# Patient Record
Sex: FEMALE | Race: OTHER | Employment: STUDENT | URBAN - METROPOLITAN AREA
[De-identification: names, ages, dates, MRNs, and addresses within clinical notes are randomized per-mention and may not be internally consistent; named-entity substitution may affect disease eponyms.]

---
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
## 2019-02-18 ENCOUNTER — TELEPHONE (OUTPATIENT)
Dept: GASTROENTEROLOGY | Facility: CLINIC | Age: 19
End: 2019-02-18

## 2019-02-18 NOTE — TELEPHONE ENCOUNTER
Marifer Christianson requesting RN to mail U/S abdomen complete to    [address] [place], [address] [facility]    Also requesting orders to be faxed to [phone] attn: Roel Brito. For add'l questions pls call. Thank you.

## 2019-03-11 ENCOUNTER — TELEPHONE (OUTPATIENT)
Dept: GASTROENTEROLOGY | Facility: CLINIC | Age: 19
End: 2019-03-11

## 2019-03-11 NOTE — TELEPHONE ENCOUNTER
Received results of abdominal US from [facility] and placed on CAB's desk for review with a scanning sheet

## 2019-03-19 ENCOUNTER — TELEPHONE (OUTPATIENT)
Dept: GASTROENTEROLOGY | Facility: CLINIC | Age: 19
End: 2019-03-19

## 2019-03-19 NOTE — TELEPHONE ENCOUNTER
Faxed report received from [facility]. Ultrasound abdomen complete  3/5/2019    \"Indication: Periumbilical mass, abdominal pain    Findings: The liver is homogenous in echotexture and normal in size.   The gallbladder is moderately di

## 2019-03-19 NOTE — TELEPHONE ENCOUNTER
GI RNs–  Please call Ms. Leo Dillon or her mother to advise that the abdominal ultrasound exam of 3/5/2019 looked good. There was nothing wrong on that US exam to explain her pain and vomiting. Most importantly, normal gallbladder, no gallstones.     Normal

## 2019-03-20 NOTE — TELEPHONE ENCOUNTER
Mother calling for results from [address] [facility] [address].  Please call [phone]

## 2019-03-20 NOTE — TELEPHONE ENCOUNTER
Pt notified of results and verbalizes understanding. Mother is still perplexed    I asked the mother if her daughter stopped the marijuana for 2 months to see if her symptoms improved.  She will ask her daughter    If no improvement of symptoms, they will c

## 2019-03-27 NOTE — TELEPHONE ENCOUNTER
I spoke to the pt's mother. She states her daughter works late at night and comes home and eats. Her daughter says she thinks this is why food is coming up on her    She is going to give it some time and change her eating habits.     If symptoms do not

## 2022-08-23 ENCOUNTER — TELEPHONE (OUTPATIENT)
Dept: OBGYN CLINIC | Facility: CLINIC | Age: 22
End: 2022-08-23

## 2022-08-23 ENCOUNTER — OFFICE VISIT (OUTPATIENT)
Dept: OBGYN CLINIC | Facility: CLINIC | Age: 22
End: 2022-08-23
Payer: COMMERCIAL

## 2022-08-23 VITALS
SYSTOLIC BLOOD PRESSURE: 100 MMHG | HEIGHT: 60 IN | DIASTOLIC BLOOD PRESSURE: 62 MMHG | WEIGHT: 141 LBS | BODY MASS INDEX: 27.68 KG/M2

## 2022-08-23 DIAGNOSIS — N93.9 ABNORMAL UTERINE BLEEDING: Primary | ICD-10-CM

## 2022-08-23 PROBLEM — Z30.09 ENCOUNTER FOR EDUCATION ABOUT CONTRACEPTIVE USE: Status: RESOLVED | Noted: 2017-06-26 | Resolved: 2022-08-23

## 2022-08-23 LAB
CONTROL LINE PRESENT WITH A CLEAR BACKGROUND (YES/NO): YES YES/NO
KIT EXPIRATION DATE: NORMAL DATE
PREGNANCY TEST, URINE: NEGATIVE

## 2022-08-23 PROCEDURE — 3008F BODY MASS INDEX DOCD: CPT | Performed by: OBSTETRICS & GYNECOLOGY

## 2022-08-23 PROCEDURE — 3074F SYST BP LT 130 MM HG: CPT | Performed by: OBSTETRICS & GYNECOLOGY

## 2022-08-23 PROCEDURE — 99214 OFFICE O/P EST MOD 30 MIN: CPT | Performed by: OBSTETRICS & GYNECOLOGY

## 2022-08-23 PROCEDURE — 87591 N.GONORRHOEAE DNA AMP PROB: CPT | Performed by: OBSTETRICS & GYNECOLOGY

## 2022-08-23 PROCEDURE — 87491 CHLMYD TRACH DNA AMP PROBE: CPT | Performed by: OBSTETRICS & GYNECOLOGY

## 2022-08-23 PROCEDURE — 81025 URINE PREGNANCY TEST: CPT | Performed by: OBSTETRICS & GYNECOLOGY

## 2022-08-23 PROCEDURE — 3078F DIAST BP <80 MM HG: CPT | Performed by: OBSTETRICS & GYNECOLOGY

## 2022-08-23 NOTE — TELEPHONE ENCOUNTER
Pt is calling and stated she has a place where she want to do her US.  [address]  the phone number is [phone]

## 2022-08-23 NOTE — TELEPHONE ENCOUNTER
LMTCB    Faxed order to [facility]. Called pt, LVM order faxed, call [facility] to schedule. Pt called back and informed. Pt agrees.

## 2022-08-25 ENCOUNTER — TELEPHONE (OUTPATIENT)
Dept: OBGYN CLINIC | Facility: CLINIC | Age: 22
End: 2022-08-25

## 2022-08-25 ENCOUNTER — PATIENT MESSAGE (OUTPATIENT)
Dept: OBGYN CLINIC | Facility: CLINIC | Age: 22
End: 2022-08-25

## 2022-08-25 LAB
C TRACH DNA SPEC QL NAA+PROBE: POSITIVE
N GONORRHOEA DNA SPEC QL NAA+PROBE: NEGATIVE

## 2022-08-25 RX ORDER — DOXYCYCLINE HYCLATE 100 MG
100 TABLET ORAL 2 TIMES DAILY
Qty: 20 TABLET | Refills: 0 | Status: SHIPPED | OUTPATIENT
Start: 2022-08-25 | End: 2022-09-04

## 2022-08-25 NOTE — TELEPHONE ENCOUNTER
From: Hemanth Cantu  To: Yoseph Gordon MD  Sent: 8/25/2022 10:33 AM CDT  Subject: Radhahawa Charles- Pap Results, Blood Results, and Urine Results    Hello Dr,  I have placed the attachments below from my lab results from [address] [place] [address] from my latest tests.

## 2022-08-25 NOTE — PROGRESS NOTES
Called pt and provided LC's instructions. Pt would like to continue with ultrasound d/t having bleeding before Chlamydia. Per pt had urine Chlamydia test in June at [facility] which was neg. Reviewed care everywhere, did not see results therefore encouraged to send screen shot via my chart. Pt verbalized understanding.

## 2022-09-12 ENCOUNTER — TELEPHONE (OUTPATIENT)
Dept: OBGYN CLINIC | Facility: CLINIC | Age: 22
End: 2022-09-12

## 2022-09-12 NOTE — TELEPHONE ENCOUNTER
RN spoke with pt. Pt states that she had her US done earlier this month at an outside facility, and she would like to review her results with LC. RN told pt that this office has not seen the results. RN provided fax number and told pt to have results faxed to our office. Pt verbalized understanding and agreed with POC.

## 2022-09-12 NOTE — TELEPHONE ENCOUNTER
Pt called phone service stating she would like to [facility] regarding her ultrasound results. Please follow up.

## 2022-09-15 ENCOUNTER — TELEPHONE (OUTPATIENT)
Dept: OBGYN CLINIC | Facility: CLINIC | Age: 22
End: 2022-09-15

## 2022-09-15 NOTE — TELEPHONE ENCOUNTER
Called pt informed report on LC's desk and on Tuesday when she is in the office, she can provide results. Pt agrees.

## 2022-09-15 NOTE — TELEPHONE ENCOUNTER
Patient is calling in to see if we have received the US report from [facility] and would also like to know the results.

## 2022-09-20 ENCOUNTER — MED REC SCAN ONLY (OUTPATIENT)
Dept: OBGYN CLINIC | Facility: CLINIC | Age: 22
End: 2022-09-20

## 2022-12-16 ENCOUNTER — TELEPHONE (OUTPATIENT)
Dept: OBGYN CLINIC | Facility: CLINIC | Age: 22
End: 2022-12-16

## 2023-02-04 ENCOUNTER — PATIENT MESSAGE (OUTPATIENT)
Dept: OBGYN CLINIC | Facility: CLINIC | Age: 23
End: 2023-02-04

## 2023-02-06 NOTE — TELEPHONE ENCOUNTER
From: Irma Jeffries  To: Mirna Dakin, MD  Sent: 2/4/2023 10:44 AM CST  Subject: Hillery Cornea [facility],    I wanted to follow up with you about my unusual missed periods. After taking my antibiotics for chlamydia I noticed that I started bleeding but I thought it was part of the medication or just my period coming through. A month after I believe I did get my period because I had all the symptoms for it and it was light and lasted as long as a period would. That was back in September, since then I still have not gotten a period. I have also have not had any sexual interactions with anyone since taking the antibiotics. I am kindly reaching out to see what I should do or how can this be further evaluated If this is not normal. Hope to hear from you soon.  Thank you    - Alondra Bowman

## 2023-02-13 ENCOUNTER — PATIENT MESSAGE (OUTPATIENT)
Dept: OBGYN CLINIC | Facility: CLINIC | Age: 23
End: 2023-02-13

## 2023-02-14 ENCOUNTER — PATIENT MESSAGE (OUTPATIENT)
Dept: OBGYN CLINIC | Facility: CLINIC | Age: 23
End: 2023-02-14

## 2023-02-14 NOTE — TELEPHONE ENCOUNTER
From: Maximo Streeter  Sent: 2/13/2023 4:25 PM CST  To: [facility] Clinical Staff  Subject: Follow Up    Peggy Mulligan,  I have a scheduled appointment with my primary doctor tomorrow to get the labs completed. Can you send the order to her fax number ? [phone]. I have already let them know to send my results to Dr. Kyree Nicole. Is there a fax number I should provide my physician with in order to send the results?

## 2024-07-01 ENCOUNTER — TELEPHONE (OUTPATIENT)
Dept: OBGYN CLINIC | Facility: CLINIC | Age: 24
End: 2024-07-01

## 2024-07-01 NOTE — TELEPHONE ENCOUNTER
Received a call from patient seeking to be seen today .The patient believes she is experiencing a miscarriage due to cramping and bleeding that started this morning   Also there was some spotting on Friday but with no pain .    Please advice .

## 2024-07-01 NOTE — TELEPHONE ENCOUNTER
RN spoke with pt. Pt's LMP was 5/26 (5w1d). Pt was a few days late last week, she should took a test and had 2 +pregnancy tests. On Friday, she had some brown spotting, but it resolved. Yesterday, she had more brown spotting, but it resolved. Today she is having bright red bleeding and cramps. Pt almost filled a pad in 3.5 hours. 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 RN told pt to report to ER for further evaluation. Pt doesn't want to go to the ER and would like to know what to look out for. RN told pt that she should monitor her bleeding and that she may pass clots and tissue if she is indeed miscarrying. RN told pt that if she starts saturating a pad hourly or feels dizzy or lightheaded, she should go directly to the ER. RN told pt to check in later this afternoon to let RN know how she's doing. Pt verbalized understanding and agreed with plan of care.

## 2024-07-01 NOTE — TELEPHONE ENCOUNTER
RN spoke with pt. Pt is still bleeding and thinks that she is passing tissue and clots. Pt is still having cramping. RN told pt that this will likely continue for a little while. Pt is wondering about next steps. RN told pt that RN would reach out to Dr. Hammonds and would call her back. Pt verbalized understanding and agreed with plan of care.

## 2024-07-01 NOTE — TELEPHONE ENCOUNTER
RN spoke with pt and scheduled her for a follow-up appt with Dr. Pires (OK per RD). Pt verbalized understanding and agreed with plan of care.   Detail Level: Generalized Detail Level: Zone Detail Level: Simple

## 2024-07-08 ENCOUNTER — OFFICE VISIT (OUTPATIENT)
Dept: OBGYN CLINIC | Facility: CLINIC | Age: 24
End: 2024-07-08
Payer: COMMERCIAL

## 2024-07-08 VITALS
SYSTOLIC BLOOD PRESSURE: 102 MMHG | WEIGHT: 137 LBS | DIASTOLIC BLOOD PRESSURE: 68 MMHG | BODY MASS INDEX: 26.9 KG/M2 | HEIGHT: 60 IN

## 2024-07-08 DIAGNOSIS — N89.8 VAGINAL ODOR: ICD-10-CM

## 2024-07-08 DIAGNOSIS — O03.9 MISCARRIAGE (HCC): Primary | ICD-10-CM

## 2024-07-08 RX ORDER — CABERGOLINE 0.5 MG/1
TABLET ORAL
COMMUNITY
Start: 2023-05-08

## 2024-07-08 RX ORDER — FLUCONAZOLE 150 MG/1
150 TABLET ORAL WEEKLY
COMMUNITY
Start: 2024-06-06 | End: 2024-07-08 | Stop reason: ALTCHOICE

## 2024-07-08 RX ORDER — CEPHALEXIN 500 MG/1
CAPSULE ORAL
COMMUNITY
Start: 2024-05-07 | End: 2024-07-08 | Stop reason: ALTCHOICE

## 2024-07-08 NOTE — PROGRESS NOTES
RETURN GYN OFFICE VISIT  [facility]    CHIEF COMPLAINT:    Chief Complaint   Patient presents with    Miscarriage     Pt states no vaginal bleeding or cramping        HISTORY OF PRESENT ILLNESS:    REEMA is a 23 year old female  here for   Bleeding got heaiver than a period when she called last week,    Bleeding stopped 3 days ago, last  spotting was yesterday .    No cramping.    LMP 5/, typically normal periods.    Has noticed a different odor when peeing - UA was negative. Somtimes has some discomfort with sex. Wonders if there is an infection.    REVIEW OF SYSTEMS:   CONSTITUTIONAL:  negative for fevers, chills, sweats and fatigue  GASTROINTESTINAL:  negative for nausea, vomiting, blood in stool, constipation, diarrhea and abdominal pain  GENITOURINARY: negative for no dysuria, urgency or frequency; no urinary incontinence; no hematuria  SKIN:  negative for  rash, skin lesion and pruritus  ENDOCRINE:  negative for acne, fatigue, weight gain and weight loss  BEHAVIOR/PSYCH:  negative for depressed mood, anhedonia and anxiety    CURRENT MEDICATIONS:      Current Outpatient Medications:     cabergoline 0.5 MG Oral Tab, 1/2  Tablet 3x a week (Mon/Thursday/Sat), Disp: , Rfl:     PAST MEDICAL, SOCIAL AND FAMILY HISTORY:    History reviewed. No pertinent past medical history.  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      CONSTITUTIONAL:  Awake, alert, cooperative, no apparent distress  EYES: sclera clear and conjunctiva normal  GASTROINTESTINAL:  soft, non-distended, non-tender, no masses palpated  GENITAL/URINARY:    External Genitalia:  General appearance; normal, Hair distribution; normal, Lesions absent   Urethral Meatus:  Lesions absent, Prolapse absent  Bladder:  Tenderness absent, Cystocele absent  Vagina:  Discharge absent, Lesions absent, Pelvic support normal  Cervix:  Lesions absent, Discharge absent, Tenderness absent  Uterus:  Size normal, Masses absent, Tenderness absent  Adnexa:  Masses absent, Tenderness absent  Anus/Perineum:  Lesions absent  SKIN:  No rashes  PSYCH:  Affect Normal      ASSESSMENT AND PLAN:  1. Miscarriage (HCC)  - HCG to ensure miscarriage is complete.  - ok to wait for 2 periods, then try for pregnancy again.  - cont taking prenatal vitamins.  - HCG, Beta Subunit (Quant Pregnancy Test); Future    2. Vaginal odor  - VIKOR VAGINAL ID; Future        Cate Pires DO

## 2024-07-11 ENCOUNTER — TELEPHONE (OUTPATIENT)
Dept: OBGYN CLINIC | Facility: CLINIC | Age: 24
End: 2024-07-11

## 2024-07-11 DIAGNOSIS — B96.89 BACTERIAL VAGINOSIS: Primary | ICD-10-CM

## 2024-07-11 DIAGNOSIS — N76.0 BACTERIAL VAGINOSIS: Primary | ICD-10-CM

## 2024-07-11 NOTE — TELEPHONE ENCOUNTER
LMTCB      +VIKOR:  Lactobacillus iners: 83.319%  Gardnerella vaginalis: 8.332%  Lactobacillus jensenii: 8.332%    Treatment:  Metronidazole 500mg BID x7 days

## 2024-07-12 RX ORDER — METRONIDAZOLE 500 MG/1
500 TABLET ORAL 2 TIMES DAILY
Qty: 14 TABLET | Refills: 0 | Status: SHIPPED | OUTPATIENT
Start: 2024-07-12

## 2024-07-12 NOTE — TELEPHONE ENCOUNTER
Pt contacted,  and name verified. Pt provided with lab result. RN told pt that abx would be sent to her preferred pharmacy (verified). RN provided admin instructions. Pt verbalized understanding and agreed with POC.

## 2024-07-29 ENCOUNTER — NURSE ONLY (OUTPATIENT)
Dept: OBGYN CLINIC | Facility: CLINIC | Age: 24
End: 2024-07-29
Payer: COMMERCIAL

## 2024-07-29 ENCOUNTER — TELEPHONE (OUTPATIENT)
Dept: OBGYN CLINIC | Facility: CLINIC | Age: 24
End: 2024-07-29

## 2024-07-29 DIAGNOSIS — R39.9 UTI SYMPTOMS: Primary | ICD-10-CM

## 2024-07-29 LAB
APPEARANCE: CLEAR
BILIRUBIN: NEGATIVE
GLUCOSE (URINE DIPSTICK): NEGATIVE MG/DL
KETONES (URINE DIPSTICK): NEGATIVE MG/DL
MULTISTIX LOT#: ABNORMAL NUMERIC
NITRITE, URINE: POSITIVE
OCCULT BLOOD: NEGATIVE
PH, URINE: 6.5 (ref 4.5–8)
PROTEIN (URINE DIPSTICK): NEGATIVE MG/DL
SPECIFIC GRAVITY: 1.01 (ref 1–1.03)
URINE-COLOR: YELLOW
UROBILINOGEN,SEMI-QN: 0.2 MG/DL (ref 0–1.9)

## 2024-07-29 PROCEDURE — 87186 SC STD MICRODIL/AGAR DIL: CPT | Performed by: OBSTETRICS & GYNECOLOGY

## 2024-07-29 PROCEDURE — 87086 URINE CULTURE/COLONY COUNT: CPT | Performed by: OBSTETRICS & GYNECOLOGY

## 2024-07-29 PROCEDURE — 87077 CULTURE AEROBIC IDENTIFY: CPT | Performed by: OBSTETRICS & GYNECOLOGY

## 2024-07-29 NOTE — TELEPHONE ENCOUNTER
RN spoke to pt. Pt was having UTI symptoms last month, but tested negative. Pt tested +BV and was treated with metronidazole. Pt is again having UTI symptoms (urgency, burning with urination). Pt denies itching, discharge, but does report vaginal dryness during intercourse. RN sked pt for UA/UC at [facility] today. Directions provided. Pt verbalized understanding and agreed with plan of care.

## 2024-07-29 NOTE — TELEPHONE ENCOUNTER
Patient is calling requesting to speak to doctor per patient it is regarding last visit on 7/08/2024. Please follow up with patient.

## 2024-07-29 NOTE — PROGRESS NOTES
Pt is again having UTI symptoms (urgency, burning with urination). Pt denies itching, discharge, but does report vaginal dryness during intercourse. Urine culture received and sent to lab.

## 2024-07-30 RX ORDER — SULFAMETHOXAZOLE AND TRIMETHOPRIM 800; 160 MG/1; MG/1
1 TABLET ORAL 2 TIMES DAILY
Qty: 6 TABLET | Refills: 0 | Status: SHIPPED | OUTPATIENT
Start: 2024-07-30 | End: 2024-08-02

## 2025-05-13 ENCOUNTER — TELEPHONE (OUTPATIENT)
Age: 25
End: 2025-05-13

## 2025-05-13 NOTE — TELEPHONE ENCOUNTER
New patient transferred to triage:    Patient seeking appointment; Needs a primary; complains of stomach pains. when eats certain foods get stomach pains. Hot chips, salad dressing.     Patient Had gone to ER 4 weeks ago at [place] as she had excruciating pain.   Was told it was acid reflux. No diagnostic was done. Only had labs drawn.      Since then, she has been using tums and other otc products.     No vomiting, no diarrhea. No fever.     Recovering fine from delivery. (6 weeks post partum)    Patient needs evaluation.  Patient seeking appointment with DR Patel since her family sees her. There are no available appointment soon.   Appointment given with WILLY Wen 5/15/25.   Offered appointment tomorrow, but not able to come in.     Patient agreed with appointment date.

## 2025-05-15 ENCOUNTER — OFFICE VISIT (OUTPATIENT)
Dept: INTERNAL MEDICINE CLINIC | Facility: CLINIC | Age: 25
End: 2025-05-15

## 2025-05-15 VITALS
WEIGHT: 145 LBS | OXYGEN SATURATION: 94 % | HEART RATE: 80 BPM | HEIGHT: 60 IN | BODY MASS INDEX: 28.47 KG/M2 | SYSTOLIC BLOOD PRESSURE: 98 MMHG | DIASTOLIC BLOOD PRESSURE: 61 MMHG

## 2025-05-15 DIAGNOSIS — Z09 HOSPITAL DISCHARGE FOLLOW-UP: Primary | ICD-10-CM

## 2025-05-15 DIAGNOSIS — R10.13 EPIGASTRIC PAIN: ICD-10-CM

## 2025-05-15 PROCEDURE — 99204 OFFICE O/P NEW MOD 45 MIN: CPT | Performed by: NURSE PRACTITIONER

## 2025-05-15 RX ORDER — OMEPRAZOLE 20 MG/1
20 CAPSULE, DELAYED RELEASE ORAL
Qty: 30 CAPSULE | Refills: 0 | Status: SHIPPED | OUTPATIENT
Start: 2025-05-15

## 2025-05-15 NOTE — PROGRESS NOTES
Katharine Quezada is a 24 year old female.  Chief Complaint   Patient presents with    ER F/U     Abdominal pain and acid reflux     HPI:   She presents for ER follow up. She went to the ER on  due to having epigastric pain, nausea and vomiting. She was diagnosed acid reflux. She was told to take Tums and monitor her diet.     She continues to have episodes of epigastric pain since going to the ER. She has had about 3 episodes. She admits to eating spicy foods.       She has a history of GERD.   She is breastfeeding.   She had a  about one month ago.     Currently she denies any fevers or abdominal pain.   Current Medications[1]   Past Medical History[2]   Past Surgical History[3]   Social History:  Short Social Hx on File[4]   Family History[5]   Allergies[6]     REVIEW OF SYSTEMS:     Review of Systems   Constitutional:  Negative for fever.   HENT: Negative.     Respiratory:  Negative for cough, shortness of breath and wheezing.    Cardiovascular:  Negative for chest pain.   Gastrointestinal:  Negative for abdominal pain, constipation, diarrhea, nausea and vomiting.   Genitourinary: Negative.    Musculoskeletal: Negative.    Skin: Negative.    Neurological: Negative.    Psychiatric/Behavioral: Negative.        Wt Readings from Last 5 Encounters:   05/15/25 145 lb (65.8 kg)   24 137 lb (62.1 kg)   22 141 lb (64 kg)   18 113 lb 9.6 oz (51.5 kg) (24%, Z= -0.71)*   17 110 lb 3.2 oz (50 kg) (22%, Z= -0.76)*     * Growth percentiles are based on CDC (Girls, 2-20 Years) data.     Body mass index is 28.32 kg/m².      EXAM:   BP 98/61   Pulse 80   Ht 5' (1.524 m)   Wt 145 lb (65.8 kg)   SpO2 94%   BMI 28.32 kg/m²     Physical Exam  Vitals reviewed.   Constitutional:       Appearance: Normal appearance.   HENT:      Head: Normocephalic.   Cardiovascular:      Rate and Rhythm: Normal rate and regular rhythm.      Pulses: Normal pulses.   Pulmonary:      Breath sounds: Normal  breath sounds. No wheezing.   Abdominal:      General: Bowel sounds are normal.      Palpations: Abdomen is soft.      Tenderness: There is no abdominal tenderness.   Musculoskeletal:         General: No swelling. Normal range of motion.   Skin:     General: Skin is warm and dry.   Neurological:      Mental Status: She is alert and oriented to person, place, and time.   Psychiatric:         Mood and Affect: Mood normal.         Behavior: Behavior normal.            ASSESSMENT AND PLAN:   1. Hospital discharge follow-up  - hospital notes and labs reviewed     2. Epigastric pain  ? GERD rule out gallbladder stones   - dw patient to keep her diet bland. Handout given.   - ok to take omeprazole daily with breast feeding   - US GALLBLADDER (CPT=76705); Future  - H. Pylori Stool Ag, EIA [E]; Future  - omeprazole 20 MG Oral Capsule Delayed Release; Take 1 capsule (20 mg total) by mouth every morning before breakfast.  Dispense: 30 capsule; Refill: 0        The patient indicates understanding of these issues and agrees to the plan.  Return for if symptoms do not resolve.         [1]   Current Outpatient Medications   Medication Sig Dispense Refill    omeprazole 20 MG Oral Capsule Delayed Release Take 1 capsule (20 mg total) by mouth every morning before breakfast. 30 capsule 0    cabergoline 0.5 MG Oral Tab 1/2  Tablet 3x a week (Mon/Thursday/Sat)      metRONIDAZOLE 500 MG Oral Tab Take 1 tablet (500 mg total) by mouth in the morning and 1 tablet (500 mg total) before bedtime. (Patient not taking: Reported on 5/15/2025) 14 tablet 0   [2] History reviewed. No pertinent past medical history.  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     No h/o abuse      Social Drivers of Health     Food Insecurity: No Food Insecurity (4/28/2025)    Received from [facility]    Hunger Vital Sign     Worried About Running Out of Food in the Last Year: Never true     Ran Out of Food in the Last Year: Never true   Transportation Needs: No Transportation Needs (4/28/2025)    Received from [facility]    PRAPARE - Transportation     Lack of Transportation (Medical): No     Lack of Transportation (Non-Medical): No   Stress: No Stress Concern Present (4/28/2025)    Received from [facility]    [employer] [place] [employer] - Occupational Stress Questionnaire     Feeling of Stress : Not at all   Housing Stability: Low Risk  (4/28/2025)    Received from [facility]    Housing Stability Vital Sign     Unable to Pay for Housing in the Last Year: No     Number of Times Moved in the Last Year: 1     Homeless in the Last Year: No   [5]   Family History  Problem Relation Age of Onset    Diabetes Paternal Grandmother     Diabetes Paternal Aunt     No Known Problems Mother     Asthma Father     No Known Problems Brother     Breast Cancer Neg     Ovarian Cancer Neg     Colon Cancer Neg    [6] No Known Allergies

## (undated) NOTE — LETTER
12/17/18        Keely Barlow 89,    Nuestros registros indican que tiene análisis clínicos pendientes y/o pruebas que se ordenaron en akins nombre que no se mckeon completado.     US OF ABDOMEN